# Patient Record
Sex: MALE | URBAN - METROPOLITAN AREA
[De-identification: names, ages, dates, MRNs, and addresses within clinical notes are randomized per-mention and may not be internally consistent; named-entity substitution may affect disease eponyms.]

---

## 2024-02-05 ENCOUNTER — OV NP (OUTPATIENT)
Dept: URBAN - METROPOLITAN AREA CLINIC 96 | Facility: CLINIC | Age: 54
End: 2024-02-05

## 2024-03-01 ENCOUNTER — OV NP (OUTPATIENT)
Dept: URBAN - METROPOLITAN AREA CLINIC 96 | Facility: CLINIC | Age: 54
End: 2024-03-01

## 2024-04-08 ENCOUNTER — OV NP (OUTPATIENT)
Dept: URBAN - METROPOLITAN AREA CLINIC 98 | Facility: CLINIC | Age: 54
End: 2024-04-08

## 2024-04-24 ENCOUNTER — OV NP (OUTPATIENT)
Dept: URBAN - METROPOLITAN AREA CLINIC 98 | Facility: CLINIC | Age: 54
End: 2024-04-24

## 2024-06-19 ENCOUNTER — LAB OUTSIDE AN ENCOUNTER (OUTPATIENT)
Dept: URBAN - METROPOLITAN AREA CLINIC 98 | Facility: CLINIC | Age: 54
End: 2024-06-19

## 2024-06-19 ENCOUNTER — OFFICE VISIT (OUTPATIENT)
Dept: URBAN - METROPOLITAN AREA CLINIC 98 | Facility: CLINIC | Age: 54
End: 2024-06-19
Payer: COMMERCIAL

## 2024-06-19 VITALS
HEART RATE: 81 BPM | BODY MASS INDEX: 25.61 KG/M2 | TEMPERATURE: 97 F | HEIGHT: 68 IN | WEIGHT: 169 LBS | SYSTOLIC BLOOD PRESSURE: 164 MMHG | DIASTOLIC BLOOD PRESSURE: 116 MMHG

## 2024-06-19 DIAGNOSIS — R03.0 ELEVATED BLOOD PRESSURE READING: ICD-10-CM

## 2024-06-19 DIAGNOSIS — K62.5 RECTAL BLEEDING: ICD-10-CM

## 2024-06-19 PROCEDURE — 99203 OFFICE O/P NEW LOW 30 MIN: CPT | Performed by: INTERNAL MEDICINE

## 2024-06-19 PROCEDURE — 99243 OFF/OP CNSLTJ NEW/EST LOW 30: CPT | Performed by: INTERNAL MEDICINE

## 2024-06-19 RX ORDER — SODIUM, POTASSIUM,MAG SULFATES 17.5-3.13G
354ML SOLUTION, RECONSTITUTED, ORAL ORAL
Qty: 345 MILLILITER | Refills: 0 | OUTPATIENT
Start: 2024-06-19 | End: 2024-06-20

## 2024-06-19 RX ORDER — CARVEDILOL 6.25 MG/1
1 TABLET WITH FOOD TABLET, FILM COATED ORAL TWICE A DAY
Status: ACTIVE | COMMUNITY
Start: 2024-06-21

## 2024-06-19 RX ORDER — LISINOPRIL 5 MG/1
1 TABLET TABLET ORAL ONCE A DAY
Qty: 30 | Refills: 3 | OUTPATIENT
Start: 2024-06-19

## 2024-06-19 NOTE — HPI-TODAY'S VISIT:
Patient referred by Jono Plascencia MD for evaluation of rectal bleeding. Copy of this consult OV sent to Dr. Plascencia. 52 yo pt w 6 mos hx of intermittent rectal bleeding, moderate amount wo abdominal nor rectal pain, wo constipation, diarrhea on prn Advil, most recent episode last week. Feels weak and dizzy w these episodes. No Anorexia or weight loss. No FHx CC / pp / IBD / GI malignancies nor hematologic disorders. No previous colonoscopy. Seen uin the ER @ [facility] 1/24, normal evaluation and advised to have GI evaluation. He has no UGI sxs after extensive ROS. No urologic sxs.  No cardiorespiratory sxs. Normal labs w his PCP. No other complaints.

## 2024-06-21 ENCOUNTER — DASHBOARD ENCOUNTERS (OUTPATIENT)
Age: 54
End: 2024-06-21

## 2024-07-02 NOTE — PHYSICAL EXAM MUSCULOSKELETAL:
normal gait and station , no tenderness or deformities present Detail Level: Zone Plan: Follow up in 8 weeks. Initiate Treatment: Azelaic/Ivermectin/Metronidazole \\nQuantity: 30.0 g\\nAzelaic/Ivermectin/Metronidazole (1% ivermectin, 15% azelaic acid in 1% metronidazole 30g)\\nSig: Apply to face one or two times daily.\\n\\nSulfaCleanse 8-4 8 %-4 % topical suspension \\nQuantity: 473.0 ml  Days Supply: 30\\nSig: Apply twice daily to face

## 2024-08-05 ENCOUNTER — OFFICE VISIT (OUTPATIENT)
Dept: URBAN - METROPOLITAN AREA SURGERY CENTER 18 | Facility: SURGERY CENTER | Age: 54
End: 2024-08-05

## 2024-10-11 ENCOUNTER — ERX REFILL RESPONSE (OUTPATIENT)
Dept: URBAN - METROPOLITAN AREA CLINIC 98 | Facility: CLINIC | Age: 54
End: 2024-10-11

## 2024-10-11 RX ORDER — LISINOPRIL 5 MG/1
TAKE 1 TABLET BY MOUTH EVERY DAY FOR 30 DAYS TABLET ORAL
Qty: 90 TABLET | Refills: 1 | OUTPATIENT

## 2024-10-11 RX ORDER — LISINOPRIL 5 MG/1
1 TABLET TABLET ORAL ONCE A DAY
Qty: 30 | Refills: 3 | OUTPATIENT